# Patient Record
Sex: FEMALE | Race: WHITE | NOT HISPANIC OR LATINO | Employment: FULL TIME | URBAN - METROPOLITAN AREA
[De-identification: names, ages, dates, MRNs, and addresses within clinical notes are randomized per-mention and may not be internally consistent; named-entity substitution may affect disease eponyms.]

---

## 2021-04-16 ENCOUNTER — OFFICE VISIT (OUTPATIENT)
Dept: FAMILY MEDICINE CLINIC | Facility: CLINIC | Age: 21
End: 2021-04-16

## 2021-04-16 VITALS
SYSTOLIC BLOOD PRESSURE: 108 MMHG | HEIGHT: 66 IN | WEIGHT: 119.8 LBS | DIASTOLIC BLOOD PRESSURE: 72 MMHG | HEART RATE: 61 BPM | BODY MASS INDEX: 19.25 KG/M2 | TEMPERATURE: 97.8 F | OXYGEN SATURATION: 98 %

## 2021-04-16 DIAGNOSIS — F41.9 ANXIETY: ICD-10-CM

## 2021-04-16 DIAGNOSIS — N92.6 IRREGULAR PERIODS: ICD-10-CM

## 2021-04-16 DIAGNOSIS — F43.10 PTSD (POST-TRAUMATIC STRESS DISORDER): Primary | ICD-10-CM

## 2021-04-16 PROCEDURE — 99204 OFFICE O/P NEW MOD 45 MIN: CPT | Performed by: FAMILY MEDICINE

## 2021-04-16 RX ORDER — VENLAFAXINE HYDROCHLORIDE 37.5 MG/1
37.5 CAPSULE, EXTENDED RELEASE ORAL DAILY
Qty: 90 CAPSULE | Refills: 1 | Status: SHIPPED | OUTPATIENT
Start: 2021-04-16 | End: 2021-05-17 | Stop reason: SDUPTHER

## 2021-04-16 NOTE — PROGRESS NOTES
"Luther Sagastume is a 21 y.o. female.     Chief Complaint   Patient presents with   • Establish Care       History of Present Illness   Pt's periods are a little irregular and having painful cramping. They have always been irregular since menses. Cycles between 20-40 days. Pt has an IUD currently, mirena.     Has had depression and anxiety for years, over 8. Has tried zoloft and it did not help much. Has been going to therapy for 3 years regularly. Moods are fair but does have some depressed moods. Trouble with sleep.         The following portions of the patient's history were reviewed and updated as appropriate: allergies, current medications, past family history, past medical history, past social history, past surgical history and problem list.    Past Medical History:   Diagnosis Date   • ADHD (attention deficit hyperactivity disorder)    • Anxiety    • PTSD (post-traumatic stress disorder)        Past Surgical History:   Procedure Laterality Date   • NO PAST SURGERIES         Family History   Problem Relation Age of Onset   • Other Mother         osteoporosis   • No Known Problems Father    • Cancer Maternal Aunt         breast cancer- gene neg   • Other Maternal Grandmother        Social History     Socioeconomic History   • Marital status: Single     Spouse name: Not on file   • Number of children: Not on file   • Years of education: Not on file   • Highest education level: Not on file   Tobacco Use   • Smoking status: Never Smoker   • Smokeless tobacco: Never Used   Substance and Sexual Activity   • Alcohol use: Yes       Review of Systems   Constitutional: Negative for fever.   Respiratory: Negative for shortness of breath.    Cardiovascular: Negative for chest pain.   Psychiatric/Behavioral: Negative for suicidal ideas.       Objective   Visit Vitals  /72 (BP Location: Left arm, Patient Position: Sitting)   Pulse 61   Temp 97.8 °F (36.6 °C)   Ht 167.6 cm (66\")   Wt 54.3 kg (119 lb 12.8 oz) "   SpO2 98%   BMI 19.34 kg/m²     Body mass index is 19.34 kg/m².  Physical Exam  Constitutional:       Appearance: Normal appearance. She is well-developed.   Cardiovascular:      Rate and Rhythm: Normal rate and regular rhythm.      Heart sounds: Normal heart sounds.   Pulmonary:      Effort: Pulmonary effort is normal.      Breath sounds: Normal breath sounds.   Musculoskeletal:         General: No swelling. Normal range of motion.   Skin:     General: Skin is warm and dry.      Findings: No rash.   Neurological:      General: No focal deficit present.      Mental Status: She is alert and oriented to person, place, and time.   Psychiatric:         Mood and Affect: Mood normal.         Behavior: Behavior normal.           Assessment/Plan   Diagnoses and all orders for this visit:    1. PTSD (post-traumatic stress disorder) (Primary)  -     venlafaxine XR (Effexor XR) 37.5 MG 24 hr capsule; Take 1 capsule by mouth Daily.  Dispense: 90 capsule; Refill: 1    2. Anxiety  -     venlafaxine XR (Effexor XR) 37.5 MG 24 hr capsule; Take 1 capsule by mouth Daily.  Dispense: 90 capsule; Refill: 1    3. Irregular periods  -     Ambulatory Referral to Obstetrics / Gynecology        Discussed risks and benefits of medication and f/u in 1 month. Will consider increasing med or adding in trazodone. Or both. Cont therapy.

## 2021-05-17 ENCOUNTER — OFFICE VISIT (OUTPATIENT)
Dept: FAMILY MEDICINE CLINIC | Facility: CLINIC | Age: 21
End: 2021-05-17

## 2021-05-17 VITALS
HEART RATE: 91 BPM | TEMPERATURE: 98.7 F | DIASTOLIC BLOOD PRESSURE: 78 MMHG | HEIGHT: 66 IN | WEIGHT: 119.8 LBS | SYSTOLIC BLOOD PRESSURE: 120 MMHG | BODY MASS INDEX: 19.25 KG/M2 | OXYGEN SATURATION: 99 %

## 2021-05-17 DIAGNOSIS — F41.9 ANXIETY: ICD-10-CM

## 2021-05-17 DIAGNOSIS — F43.10 PTSD (POST-TRAUMATIC STRESS DISORDER): ICD-10-CM

## 2021-05-17 DIAGNOSIS — E16.2 HYPOGLYCEMIA: Primary | ICD-10-CM

## 2021-05-17 PROCEDURE — 99214 OFFICE O/P EST MOD 30 MIN: CPT | Performed by: FAMILY MEDICINE

## 2021-05-17 RX ORDER — VENLAFAXINE HYDROCHLORIDE 75 MG/1
75 CAPSULE, EXTENDED RELEASE ORAL DAILY
Qty: 90 CAPSULE | Refills: 3 | Status: SHIPPED | OUTPATIENT
Start: 2021-05-17 | End: 2021-08-04 | Stop reason: SDUPTHER

## 2021-05-17 NOTE — PROGRESS NOTES
"Luther Sagastume is a 21 y.o. female.     Chief Complaint   Patient presents with   • Follow-up       History of Present Illness   Anxiety is better on meds. Moods are doing better. Still having some anxiety. Meds really helping. Sleeping great.     Pt having some episodes of shakiness if she doesn't eat for more than 4 hours during the day. For years.      The following portions of the patient's history were reviewed and updated as appropriate: allergies, current medications, past family history, past medical history, past social history, past surgical history and problem list.    Past Medical History:   Diagnosis Date   • ADHD (attention deficit hyperactivity disorder)    • Anxiety    • PTSD (post-traumatic stress disorder)        Past Surgical History:   Procedure Laterality Date   • NO PAST SURGERIES         Family History   Problem Relation Age of Onset   • Other Mother         osteoporosis   • No Known Problems Father    • Cancer Maternal Aunt         breast cancer- gene neg   • Other Maternal Grandmother        Social History     Socioeconomic History   • Marital status: Single     Spouse name: Not on file   • Number of children: Not on file   • Years of education: Not on file   • Highest education level: Not on file   Tobacco Use   • Smoking status: Never Smoker   • Smokeless tobacco: Never Used   Substance and Sexual Activity   • Alcohol use: Yes       Review of Systems   Constitutional: Negative for fever.       Objective   Visit Vitals  /78 (BP Location: Left arm, Patient Position: Sitting)   Pulse 91   Temp 98.7 °F (37.1 °C)   Ht 167.6 cm (65.98\")   Wt 54.3 kg (119 lb 12.8 oz)   SpO2 99%   BMI 19.35 kg/m²     Body mass index is 19.35 kg/m².  Physical Exam  Constitutional:       General: She is not in acute distress.     Appearance: Normal appearance.   Neurological:      General: No focal deficit present.      Mental Status: She is alert and oriented to person, place, and time. "   Psychiatric:         Mood and Affect: Mood normal.         Behavior: Behavior normal.           Assessment/Plan   Diagnoses and all orders for this visit:    1. Hypoglycemia (Primary)  -     CBC & Differential  -     Comprehensive Metabolic Panel  -     Hemoglobin A1c    2. PTSD (post-traumatic stress disorder)  -     venlafaxine XR (Effexor XR) 75 MG 24 hr capsule; Take 1 capsule by mouth Daily.  Dispense: 90 capsule; Refill: 3    3. Anxiety  -     venlafaxine XR (Effexor XR) 75 MG 24 hr capsule; Take 1 capsule by mouth Daily.  Dispense: 90 capsule; Refill: 3        Discussed regular meals. If labs abnormal will refer to endocrine. Increased meds. F/U in 6 months.

## 2021-05-18 LAB
ALBUMIN SERPL-MCNC: 4.9 G/DL (ref 3.5–5.2)
ALBUMIN/GLOB SERPL: 2 G/DL
ALP SERPL-CCNC: 75 U/L (ref 39–117)
ALT SERPL-CCNC: 16 U/L (ref 1–33)
AST SERPL-CCNC: 17 U/L (ref 1–32)
BASOPHILS # BLD AUTO: 0.05 10*3/MM3 (ref 0–0.2)
BASOPHILS NFR BLD AUTO: 1 % (ref 0–1.5)
BILIRUB SERPL-MCNC: 0.7 MG/DL (ref 0–1.2)
BUN SERPL-MCNC: 14 MG/DL (ref 6–20)
BUN/CREAT SERPL: 19.7 (ref 7–25)
CALCIUM SERPL-MCNC: 9.4 MG/DL (ref 8.6–10.5)
CHLORIDE SERPL-SCNC: 104 MMOL/L (ref 98–107)
CO2 SERPL-SCNC: 25.1 MMOL/L (ref 22–29)
CREAT SERPL-MCNC: 0.71 MG/DL (ref 0.57–1)
EOSINOPHIL # BLD AUTO: 0.27 10*3/MM3 (ref 0–0.4)
EOSINOPHIL NFR BLD AUTO: 5.2 % (ref 0.3–6.2)
ERYTHROCYTE [DISTWIDTH] IN BLOOD BY AUTOMATED COUNT: 11.8 % (ref 12.3–15.4)
GLOBULIN SER CALC-MCNC: 2.4 GM/DL
GLUCOSE SERPL-MCNC: 91 MG/DL (ref 65–99)
HBA1C MFR BLD: 4.7 % (ref 4.8–5.6)
HCT VFR BLD AUTO: 43.1 % (ref 34–46.6)
HGB BLD-MCNC: 14.2 G/DL (ref 12–15.9)
IMM GRANULOCYTES # BLD AUTO: 0.01 10*3/MM3 (ref 0–0.05)
IMM GRANULOCYTES NFR BLD AUTO: 0.2 % (ref 0–0.5)
LYMPHOCYTES # BLD AUTO: 1.64 10*3/MM3 (ref 0.7–3.1)
LYMPHOCYTES NFR BLD AUTO: 31.7 % (ref 19.6–45.3)
MCH RBC QN AUTO: 30.2 PG (ref 26.6–33)
MCHC RBC AUTO-ENTMCNC: 32.9 G/DL (ref 31.5–35.7)
MCV RBC AUTO: 91.7 FL (ref 79–97)
MONOCYTES # BLD AUTO: 0.41 10*3/MM3 (ref 0.1–0.9)
MONOCYTES NFR BLD AUTO: 7.9 % (ref 5–12)
NEUTROPHILS # BLD AUTO: 2.79 10*3/MM3 (ref 1.7–7)
NEUTROPHILS NFR BLD AUTO: 54 % (ref 42.7–76)
NRBC BLD AUTO-RTO: 0 /100 WBC (ref 0–0.2)
PLATELET # BLD AUTO: 223 10*3/MM3 (ref 140–450)
POTASSIUM SERPL-SCNC: 4.8 MMOL/L (ref 3.5–5.2)
PROT SERPL-MCNC: 7.3 G/DL (ref 6–8.5)
RBC # BLD AUTO: 4.7 10*6/MM3 (ref 3.77–5.28)
SODIUM SERPL-SCNC: 140 MMOL/L (ref 136–145)
WBC # BLD AUTO: 5.17 10*3/MM3 (ref 3.4–10.8)

## 2021-08-04 ENCOUNTER — TELEPHONE (OUTPATIENT)
Dept: FAMILY MEDICINE CLINIC | Facility: CLINIC | Age: 21
End: 2021-08-04

## 2021-08-04 DIAGNOSIS — F43.10 PTSD (POST-TRAUMATIC STRESS DISORDER): ICD-10-CM

## 2021-08-04 DIAGNOSIS — F41.9 ANXIETY: ICD-10-CM

## 2021-08-04 RX ORDER — VENLAFAXINE HYDROCHLORIDE 75 MG/1
75 CAPSULE, EXTENDED RELEASE ORAL DAILY
Qty: 90 CAPSULE | Refills: 3 | Status: CANCELLED | OUTPATIENT
Start: 2021-08-04

## 2021-08-04 RX ORDER — VENLAFAXINE HYDROCHLORIDE 37.5 MG/1
37.5 CAPSULE, EXTENDED RELEASE ORAL DAILY
Qty: 90 CAPSULE | Refills: 1 | Status: SHIPPED | OUTPATIENT
Start: 2021-08-04 | End: 2022-02-03

## 2021-08-04 NOTE — TELEPHONE ENCOUNTER
Caller: Marla Sagastume    Relationship: Self    Best call back number: [phone]    What medications are you currently taking:   Current Outpatient Medications on File Prior to Visit   Medication Sig Dispense Refill   • venlafaxine XR (Effexor XR) 75 MG 24 hr capsule Take 1 capsule by mouth Daily. 90 capsule 3     No current facility-administered medications on file prior to visit.        When did you start taking these medications: 5/17/21    Which medication are you concerned about: MERVAT    Who prescribed you this medication: DR. MUNIZ    What are your concerns: EXTREME FATIGUE    How long have you been taking these medications: 5/17/21    How long have you had these concerns: SINCE THE MEDICATION WAS INCREASED

## 2021-08-04 NOTE — TELEPHONE ENCOUNTER
Usually this medication gives you more energy.  You can always try to take it at a different time of day and see if that helps.  We can always go right back down on the dose.  Please let me know what you like me to do.

## 2021-08-04 NOTE — TELEPHONE ENCOUNTER
Pt stated she tried taking medication at a different time and didn't help. She would like to go down on the dose. She now uses the [facility] pharmacy on [address] in [place], [place]

## 2021-10-18 ENCOUNTER — OFFICE VISIT (OUTPATIENT)
Dept: OBSTETRICS AND GYNECOLOGY | Age: 21
End: 2021-10-18

## 2021-10-18 VITALS
WEIGHT: 118.4 LBS | BODY MASS INDEX: 19.03 KG/M2 | SYSTOLIC BLOOD PRESSURE: 102 MMHG | HEIGHT: 66 IN | DIASTOLIC BLOOD PRESSURE: 58 MMHG

## 2021-10-18 DIAGNOSIS — B37.31 YEAST VAGINITIS: ICD-10-CM

## 2021-10-18 DIAGNOSIS — B96.89 BV (BACTERIAL VAGINOSIS): ICD-10-CM

## 2021-10-18 DIAGNOSIS — Z01.419 ENCOUNTER FOR GYNECOLOGICAL EXAMINATION (GENERAL) (ROUTINE) WITHOUT ABNORMAL FINDINGS: Primary | ICD-10-CM

## 2021-10-18 DIAGNOSIS — Z97.5 IUD (INTRAUTERINE DEVICE) IN PLACE: ICD-10-CM

## 2021-10-18 DIAGNOSIS — Z12.4 SCREENING FOR CERVICAL CANCER: ICD-10-CM

## 2021-10-18 DIAGNOSIS — T83.32XA INTRAUTERINE CONTRACEPTIVE DEVICE THREADS LOST, INITIAL ENCOUNTER: ICD-10-CM

## 2021-10-18 DIAGNOSIS — Z76.89 ENCOUNTER TO ESTABLISH CARE: ICD-10-CM

## 2021-10-18 DIAGNOSIS — N92.1 BREAKTHROUGH BLEEDING ASSOCIATED WITH INTRAUTERINE DEVICE (IUD): ICD-10-CM

## 2021-10-18 DIAGNOSIS — N92.6 IRREGULAR PERIODS: ICD-10-CM

## 2021-10-18 DIAGNOSIS — Z97.5 BREAKTHROUGH BLEEDING ASSOCIATED WITH INTRAUTERINE DEVICE (IUD): ICD-10-CM

## 2021-10-18 DIAGNOSIS — Z11.3 SCREENING FOR STD (SEXUALLY TRANSMITTED DISEASE): ICD-10-CM

## 2021-10-18 DIAGNOSIS — Z13.89 SCREENING FOR BLOOD OR PROTEIN IN URINE: ICD-10-CM

## 2021-10-18 DIAGNOSIS — N76.0 BV (BACTERIAL VAGINOSIS): ICD-10-CM

## 2021-10-18 LAB
BILIRUB BLD-MCNC: NEGATIVE MG/DL
CLARITY, POC: CLEAR
COLOR UR: YELLOW
GLUCOSE UR STRIP-MCNC: NEGATIVE MG/DL
KETONES UR QL: NEGATIVE
LEUKOCYTE EST, POC: NEGATIVE
NITRITE UR-MCNC: NEGATIVE MG/ML
PH UR: 6.5 [PH] (ref 5–8)
PROT UR STRIP-MCNC: NEGATIVE MG/DL
RBC # UR STRIP: NEGATIVE /UL
SP GR UR: 1.02 (ref 1–1.03)
UROBILINOGEN UR QL: NORMAL

## 2021-10-18 PROCEDURE — 99385 PREV VISIT NEW AGE 18-39: CPT | Performed by: OBSTETRICS & GYNECOLOGY

## 2021-10-18 PROCEDURE — 81002 URINALYSIS NONAUTO W/O SCOPE: CPT | Performed by: OBSTETRICS & GYNECOLOGY

## 2021-10-18 NOTE — PROGRESS NOTES
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 Sometimes bleeds 7- 14 days straight. Discussed weight and nothing to oppose estrogen. Discussed options including trying ocp with Mirena or switching out to [place] or [place]. Patient opts to try ocps now - Had IUD placed somewhere in [place].  Will get TVUS to check placement. Works as  at [employer] daughter.      History of Present Illness    The following portions of the patient's history were reviewed and updated as appropriate: allergies, current medications, past family history, past medical history, past social history, past surgical history and problem list.    Review of Systems   Constitutional: Negative for chills, fatigue and fever.   Gastrointestinal: Negative for abdominal distention and abdominal pain.   Genitourinary: Positive for menstrual problem and vaginal bleeding. Negative for dyspareunia, dysuria, pelvic pain, vaginal discharge and vaginal pain.   All other systems reviewed and are negative.  /58   Ht 167.6 cm (66\")   Wt 53.7 kg (118 lb 6.4 oz)   LMP 10/09/2021 (Exact Date)   Breastfeeding No   BMI 19.11 kg/m²       Objective   Physical Exam  Vitals and nursing note reviewed.   Constitutional:       Appearance: Normal appearance. She is well-developed and normal weight.   Neck:      Thyroid: No thyromegaly.   Pulmonary:      Effort: Pulmonary effort is normal.   Chest:   Breasts:      Right: No mass, nipple discharge, skin change or tenderness.      Left: No mass, nipple discharge, skin change or tenderness.       Abdominal:      General: There is no distension.      Palpations: Abdomen is soft.      Tenderness: There is no abdominal tenderness.   Genitourinary:     General: Normal vulva.      Exam position: Lithotomy position.      Labia:         Right: No rash or lesion.         Left: No rash or lesion.       Vagina: Normal. No vaginal discharge.      Cervix: No friability, " lesion or cervical bleeding.      Uterus: Not enlarged and not tender.       Adnexa:         Right: No mass or tenderness.          Left: No mass or tenderness.        Comments: IUD strings palpated but not visualized  Musculoskeletal:         General: Normal range of motion.      Cervical back: Normal range of motion.   Skin:     General: Skin is warm and dry.      Findings: No rash.   Neurological:      Mental Status: She is alert and oriented to person, place, and time.   Psychiatric:         Mood and Affect: Mood normal.         Behavior: Behavior normal.           Assessment/Plan   Diagnoses and all orders for this visit:    1. Encounter for gynecological examination (general) (routine) without abnormal findings (Primary)  -     POC Urinalysis Dipstick  -     IGP, Rfx Aptima HPV ASCU  -     NuSwab VG+ - Swab, Vagina    2. Screening for blood or protein in urine  -     POC Urinalysis Dipstick    3. Encounter to establish care    4. IUD (intrauterine device) in place    5. Irregular periods    6. Screening for STD (sexually transmitted disease)  -     NuSwab VG+ - Swab, Vagina    7. Screening for cervical cancer  -     IGP, Rfx Aptima HPV ASCU    8. Breakthrough bleeding associated with intrauterine device (IUD)    9. Intrauterine contraceptive device threads lost, initial encounter      Counseling was given to patient for the following topics: instructions for management, impressions, risks and benefits of treatment options, importance of treatment compliance and self-breast exams .   Return in about 6 weeks (around 11/29/2021), or TVUS and f/u with me.

## 2021-10-20 LAB
A VAGINAE DNA VAG QL NAA+PROBE: ABNORMAL SCORE
BVAB2 DNA VAG QL NAA+PROBE: ABNORMAL SCORE
C ALBICANS DNA VAG QL NAA+PROBE: POSITIVE
C GLABRATA DNA VAG QL NAA+PROBE: NEGATIVE
C TRACH DNA VAG QL NAA+PROBE: NEGATIVE
CONV .: NORMAL
CYTOLOGIST CVX/VAG CYTO: NORMAL
CYTOLOGY CVX/VAG DOC CYTO: NORMAL
CYTOLOGY CVX/VAG DOC THIN PREP: NORMAL
DX ICD CODE: NORMAL
HIV 1 & 2 AB SER-IMP: NORMAL
MEGA1 DNA VAG QL NAA+PROBE: ABNORMAL SCORE
N GONORRHOEA DNA VAG QL NAA+PROBE: NEGATIVE
OTHER STN SPEC: NORMAL
PATHOLOGIST CVX/VAG CYTO: NORMAL
STAT OF ADQ CVX/VAG CYTO-IMP: NORMAL
T VAGINALIS DNA VAG QL NAA+PROBE: NEGATIVE

## 2021-10-20 RX ORDER — METRONIDAZOLE 500 MG/1
500 TABLET ORAL 2 TIMES DAILY
Qty: 14 TABLET | Refills: 0 | Status: SHIPPED | OUTPATIENT
Start: 2021-10-20 | End: 2021-10-27

## 2021-10-20 RX ORDER — FLUCONAZOLE 150 MG/1
150 TABLET ORAL ONCE
Qty: 1 TABLET | Refills: 0 | Status: SHIPPED | OUTPATIENT
Start: 2021-10-20 | End: 2021-10-20

## 2021-12-15 ENCOUNTER — OFFICE VISIT (OUTPATIENT)
Dept: OBSTETRICS AND GYNECOLOGY | Age: 21
End: 2021-12-15

## 2021-12-15 VITALS
HEIGHT: 66 IN | WEIGHT: 122 LBS | BODY MASS INDEX: 19.61 KG/M2 | DIASTOLIC BLOOD PRESSURE: 74 MMHG | SYSTOLIC BLOOD PRESSURE: 110 MMHG

## 2021-12-15 DIAGNOSIS — Z97.5 BREAKTHROUGH BLEEDING ASSOCIATED WITH INTRAUTERINE DEVICE (IUD): Primary | ICD-10-CM

## 2021-12-15 DIAGNOSIS — T83.32XD INTRAUTERINE CONTRACEPTIVE DEVICE THREADS LOST, SUBSEQUENT ENCOUNTER: ICD-10-CM

## 2021-12-15 DIAGNOSIS — N92.1 BREAKTHROUGH BLEEDING ASSOCIATED WITH INTRAUTERINE DEVICE (IUD): Primary | ICD-10-CM

## 2021-12-15 DIAGNOSIS — Z97.5 IUD (INTRAUTERINE DEVICE) IN PLACE: ICD-10-CM

## 2021-12-15 PROCEDURE — 99213 OFFICE O/P EST LOW 20 MIN: CPT | Performed by: OBSTETRICS & GYNECOLOGY

## 2021-12-15 RX ORDER — NORETHINDRONE ACETATE AND ETHINYL ESTRADIOL, ETHINYL ESTRADIOL AND FERROUS FUMARATE 1MG-10(24)
KIT ORAL DAILY
COMMUNITY
End: 2021-12-15 | Stop reason: SDUPTHER

## 2021-12-15 RX ORDER — NORETHINDRONE ACETATE AND ETHINYL ESTRADIOL, ETHINYL ESTRADIOL AND FERROUS FUMARATE 1MG-10(24)
1 KIT ORAL DAILY
Qty: 84 TABLET | Refills: 3 | Status: SHIPPED | OUTPATIENT
Start: 2021-12-15

## 2021-12-15 NOTE — PROGRESS NOTES
"Luther Sagastume is a 21 y.o. female presents for 6 wks f/u with us to check iud placement iud inserted in [place] - irregular periods since being on lolo periods are better - last period lasted only 3 days.  TVUS today reveals IUD normally placed in uterus.   Would like to continue lo=loestrin.     History of Present Illness    The following portions of the patient's history were reviewed and updated as appropriate: allergies, current medications, past family history, past medical history, past social history, past surgical history and problem list.    Review of Systems   Constitutional: Negative for chills, fatigue and fever.   Gastrointestinal: Negative for abdominal distention and abdominal pain.   Genitourinary: Negative for dysuria, menstrual problem, pelvic pain, vaginal bleeding, vaginal discharge and vaginal pain.   All other systems reviewed and are negative.  /74   Ht 167.6 cm (66\")   Wt 55.3 kg (122 lb)   LMP 11/11/2021 (Exact Date)   Breastfeeding No   BMI 19.69 kg/m²       Objective   Physical Exam  Vitals and nursing note reviewed.   Constitutional:       Appearance: Normal appearance. She is normal weight.   Pulmonary:      Effort: Pulmonary effort is normal.   Neurological:      Mental Status: She is alert and oriented to person, place, and time.   Psychiatric:         Mood and Affect: Mood normal.         Behavior: Behavior normal.         Assessment/Plan   Diagnoses and all orders for this visit:    1. Breakthrough bleeding associated with intrauterine device (IUD) (Primary)  -     Norethin-Eth Estrad-Fe Biphas (Lo Loestrin Fe) 1 MG-10 MCG / 10 MCG tablet; Take 1 tablet by mouth Daily.  Dispense: 84 tablet; Refill: 3    2. IUD (intrauterine device) in place    3. Intrauterine contraceptive device threads lost, subsequent encounter       Counseling was given to patient for the following topics: instructions for management, impressions, risks and benefits of treatment options " and importance of treatment compliance . Total time of the encounter was 20 minutes and 15 minutes was spent counseling.  Return in about 10 months (around 10/15/2022) for Annual physical.

## 2022-02-03 DIAGNOSIS — F41.9 ANXIETY: ICD-10-CM

## 2022-02-03 DIAGNOSIS — F43.10 PTSD (POST-TRAUMATIC STRESS DISORDER): ICD-10-CM

## 2022-02-03 RX ORDER — VENLAFAXINE HYDROCHLORIDE 37.5 MG/1
37.5 CAPSULE, EXTENDED RELEASE ORAL DAILY
Qty: 30 CAPSULE | Refills: 0 | Status: SHIPPED | OUTPATIENT
Start: 2022-02-03

## 2022-02-03 NOTE — TELEPHONE ENCOUNTER
Rx Refill Note  Requested Prescriptions     Pending Prescriptions Disp Refills   • venlafaxine XR (EFFEXOR-XR) 37.5 MG 24 hr capsule [Pharmacy Med Name: Venlafaxine HCl ER Oral Capsule Extended Release 24 Hour 37.5 MG] 90 capsule 0     Sig: TAKE 1 CAPSULE BY MOUTH EVERY DAY      Last office visit with prescribing clinician: 5/17/2021      Next office visit with prescribing clinician: past due 11/17/21 charles  :Bri Whitmore MA  02/03/22, 11:49 EST     30 day supply sent  Past due for appt  11/2021

## 2022-03-17 DIAGNOSIS — F41.9 ANXIETY: ICD-10-CM

## 2022-03-17 DIAGNOSIS — F43.10 PTSD (POST-TRAUMATIC STRESS DISORDER): ICD-10-CM

## 2022-03-17 RX ORDER — VENLAFAXINE HYDROCHLORIDE 37.5 MG/1
CAPSULE, EXTENDED RELEASE ORAL
Qty: 30 CAPSULE | Refills: 0 | OUTPATIENT
Start: 2022-03-17